# Patient Record
Sex: MALE | Race: WHITE | Employment: OTHER | URBAN - NONMETROPOLITAN AREA
[De-identification: names, ages, dates, MRNs, and addresses within clinical notes are randomized per-mention and may not be internally consistent; named-entity substitution may affect disease eponyms.]

---

## 2017-11-16 ENCOUNTER — HOSPITAL ENCOUNTER (OUTPATIENT)
Dept: NEUROLOGY | Age: 52
Discharge: HOME | End: 2017-11-16
Payer: MEDICARE

## 2017-11-16 PROCEDURE — 95911 NRV CNDJ TEST 9-10 STUDIES: CPT

## 2017-11-16 PROCEDURE — 95886 MUSC TEST DONE W/N TEST COMP: CPT | Performed by: PSYCHIATRY & NEUROLOGY

## 2017-11-16 PROCEDURE — 95911 NRV CNDJ TEST 9-10 STUDIES: CPT | Performed by: PSYCHIATRY & NEUROLOGY

## 2017-11-16 PROCEDURE — 95886 MUSC TEST DONE W/N TEST COMP: CPT

## 2017-11-22 ENCOUNTER — TRANSCRIBE ORDERS (OUTPATIENT)
Dept: ADMINISTRATIVE | Facility: HOSPITAL | Age: 52
End: 2017-11-22

## 2017-11-22 DIAGNOSIS — M54.2 NECK PAIN: Primary | ICD-10-CM

## 2017-11-22 DIAGNOSIS — M54.12 CERVICAL RADICULOPATHY: ICD-10-CM

## 2017-12-05 ENCOUNTER — HOSPITAL ENCOUNTER (OUTPATIENT)
Dept: MRI IMAGING | Facility: HOSPITAL | Age: 52
Discharge: HOME | End: 2017-12-05
Admitting: NURSE PRACTITIONER

## 2017-12-05 DIAGNOSIS — M54.12 CERVICAL RADICULOPATHY: ICD-10-CM

## 2017-12-05 DIAGNOSIS — M54.2 NECK PAIN: ICD-10-CM

## 2017-12-05 PROCEDURE — 72141 MRI NECK SPINE W/O DYE: CPT

## 2018-03-29 ENCOUNTER — TELEPHONE (OUTPATIENT)
Dept: NEUROLOGY | Age: 53
End: 2018-03-29

## 2018-04-20 ENCOUNTER — OFFICE VISIT (OUTPATIENT)
Dept: NEUROLOGY | Age: 53
End: 2018-04-20
Payer: MEDICARE

## 2018-04-20 VITALS
HEART RATE: 64 BPM | WEIGHT: 235.2 LBS | DIASTOLIC BLOOD PRESSURE: 76 MMHG | HEIGHT: 72 IN | BODY MASS INDEX: 31.86 KG/M2 | SYSTOLIC BLOOD PRESSURE: 132 MMHG

## 2018-04-20 DIAGNOSIS — R90.89 ABNORMAL FINDING ON MRI OF BRAIN: ICD-10-CM

## 2018-04-20 DIAGNOSIS — M47.812 SPONDYLOSIS OF CERVICAL REGION WITHOUT MYELOPATHY OR RADICULOPATHY: ICD-10-CM

## 2018-04-20 DIAGNOSIS — R20.2 BILATERAL ARM NUMBNESS AND TINGLING WHILE SLEEPING: ICD-10-CM

## 2018-04-20 DIAGNOSIS — M54.5 CHRONIC LOW BACK PAIN, UNSPECIFIED BACK PAIN LATERALITY, WITH SCIATICA PRESENCE UNSPECIFIED: ICD-10-CM

## 2018-04-20 DIAGNOSIS — R20.0 BILATERAL ARM NUMBNESS AND TINGLING WHILE SLEEPING: ICD-10-CM

## 2018-04-20 DIAGNOSIS — R41.3 MEMORY LOSS: Primary | ICD-10-CM

## 2018-04-20 DIAGNOSIS — G89.29 CHRONIC LOW BACK PAIN, UNSPECIFIED BACK PAIN LATERALITY, WITH SCIATICA PRESENCE UNSPECIFIED: ICD-10-CM

## 2018-04-20 PROCEDURE — 4004F PT TOBACCO SCREEN RCVD TLK: CPT | Performed by: PSYCHIATRY & NEUROLOGY

## 2018-04-20 PROCEDURE — G8427 DOCREV CUR MEDS BY ELIG CLIN: HCPCS | Performed by: PSYCHIATRY & NEUROLOGY

## 2018-04-20 PROCEDURE — 3017F COLORECTAL CA SCREEN DOC REV: CPT | Performed by: PSYCHIATRY & NEUROLOGY

## 2018-04-20 PROCEDURE — 99204 OFFICE O/P NEW MOD 45 MIN: CPT | Performed by: PSYCHIATRY & NEUROLOGY

## 2018-04-20 PROCEDURE — G8417 CALC BMI ABV UP PARAM F/U: HCPCS | Performed by: PSYCHIATRY & NEUROLOGY

## 2018-04-20 RX ORDER — ESCITALOPRAM OXALATE 10 MG/1
1 TABLET ORAL DAILY
Refills: 2 | COMMUNITY
Start: 2018-02-12 | End: 2022-03-09 | Stop reason: ALTCHOICE

## 2018-04-20 RX ORDER — NAPROXEN 500 MG/1
1 TABLET ORAL 2 TIMES DAILY PRN
Refills: 2 | COMMUNITY
Start: 2018-02-12 | End: 2022-03-09 | Stop reason: ALTCHOICE

## 2018-10-12 ENCOUNTER — OFFICE VISIT (OUTPATIENT)
Dept: ORTHOPEDIC SURGERY | Facility: CLINIC | Age: 53
End: 2018-10-12

## 2018-10-12 VITALS — WEIGHT: 237 LBS | HEIGHT: 72 IN | BODY MASS INDEX: 32.1 KG/M2

## 2018-10-12 DIAGNOSIS — M54.2 NECK PAIN: Primary | ICD-10-CM

## 2018-10-12 PROCEDURE — 99203 OFFICE O/P NEW LOW 30 MIN: CPT | Performed by: ORTHOPAEDIC SURGERY

## 2018-10-12 RX ORDER — TIZANIDINE 4 MG/1
4 TABLET ORAL NIGHTLY PRN
COMMUNITY

## 2018-10-12 RX ORDER — NAPROXEN 250 MG/1
250 TABLET ORAL 2 TIMES DAILY PRN
COMMUNITY

## 2018-10-12 RX ORDER — DICLOFENAC SODIUM 75 MG/1
75 TABLET, DELAYED RELEASE ORAL 2 TIMES DAILY
Qty: 60 TABLET | Refills: 2 | Status: SHIPPED | OUTPATIENT
Start: 2018-10-12

## 2018-10-12 NOTE — PROGRESS NOTES
Quan Arroyo is a 52 y.o. male   Primary provider:  Cuca Mueller APRN       Chief Complaint   Patient presents with   • Cervical Spine - Pain   • Lumbar Spine - Pain       HISTORY OF PRESENT ILLNESS: new patient with neck pain, and numbness in arms, patient was seen at [facility] in [facility] , patient had xrays done today.    52 y pain of the posterior neck region.  The pain is in the back of the head/ neck.  The pain is intermittent.  The pain is sharp and dull.   The pain is present in the neck region.  Tingling of the arm / hands  No fevers, no chills, pain is present and interrupts sleep.          Pain   This is a recurrent problem. The current episode started more than 1 year ago. The problem occurs constantly. The problem has been unchanged. Associated symptoms include joint swelling and numbness. Associated symptoms comments: tingling. The symptoms are aggravated by standing, twisting, walking and bending. He has tried nothing for the symptoms. The treatment provided no relief.        Smoker    CONCURRENT MEDICAL HISTORY:    No past medical history on file.    No Known Allergies      Current Outpatient Prescriptions:   •  naproxen (NAPROSYN) 250 MG tablet, Take 250 mg by mouth 2 (Two) Times a Day As Needed., Disp: , Rfl:   •  tiZANidine (ZANAFLEX) 4 MG tablet, Take 4 mg by mouth At Night As Needed for Muscle Spasms., Disp: , Rfl:   •  diclofenac (VOLTAREN) 75 MG EC tablet, Take 1 tablet by mouth 2 (Two) Times a Day., Disp: 60 tablet, Rfl: 2    No past surgical history on file.    No family history on file.     Social History     Social History   • Marital status:      Spouse name: N/A   • Number of children: N/A   • Years of education: N/A     Occupational History   • Not on file.     Social History Main Topics   • Smoking status: Not on file   • Smokeless tobacco: Not on file   • Alcohol use Not on file   • Drug use: Unknown   • Sexual activity: Not on file     Other Topics Concern   • Not  "on file     Social History Narrative   • No narrative on file        Review of Systems   Constitutional: Negative.    HENT: Negative.    Eyes: Negative.    Respiratory: Negative.    Cardiovascular: Negative.    Gastrointestinal: Negative.    Endocrine: Negative.    Genitourinary: Negative.    Musculoskeletal: Positive for joint swelling.   Skin: Negative.    Allergic/Immunologic: Negative.    Neurological: Positive for numbness.   Hematological: Negative.    Psychiatric/Behavioral: Negative.        PHYSICAL EXAMINATION:       Ht 182.9 cm (72\")   Wt 108 kg (237 lb)   BMI 32.14 kg/m²     Physical Exam   Constitutional: He is oriented to person, place, and time. He appears well-developed.   disheveled   HENT:   Head: Normocephalic.   Mouth/Throat: No oropharyngeal exudate.   Eyes: No scleral icterus.   Neck: No JVD present. No tracheal deviation present. No thyromegaly present.   Cardiovascular: Normal rate and intact distal pulses.    Pulmonary/Chest: Effort normal. No respiratory distress. He has no wheezes. He has no rales.   Abdominal: Soft. He exhibits no distension. There is no tenderness. There is no guarding.   Musculoskeletal: He exhibits no tenderness or deformity.   Neurological: He is alert and oriented to person, place, and time. He displays normal reflexes. No cranial nerve deficit. Coordination normal.   Skin: Skin is warm and dry. Capillary refill takes less than 2 seconds. No erythema. No pallor.   Psychiatric: He has a normal mood and affect. His behavior is normal. Thought content normal.       GAIT:     [x]  Normal  []  Antalgic    Assistive device: []  None  []  Walker     []  Crutches  []  Cane     []  Wheelchair  []  Stretcher    Ortho Exam     Cervical flexion is 30  Extension 10  Lateral rotation 20 right and left   Neuro/ motor intact    MRI of cervical spine- there is cervical disc bulge of C3-4    No results found.        ASSESSMENT:    Diagnoses and all orders for this visit:    Neck " pain    Other orders  -     naproxen (NAPROSYN) 250 MG tablet; Take 250 mg by mouth 2 (Two) Times a Day As Needed.  -     tiZANidine (ZANAFLEX) 4 MG tablet; Take 4 mg by mouth At Night As Needed for Muscle Spasms.  -     Discontinue: diclofenac (VOLTAREN) 50 MG EC tablet; Take 1 tablet by mouth 2 (Two) Times a Day.  -     diclofenac (VOLTAREN) 75 MG EC tablet; Take 1 tablet by mouth 2 (Two) Times a Day.          PLAN     I reviewed the MRI, cervical disc bulge at C3-4, which does not seem to warrant cervical discectomy.      Portillo Silverio MD

## 2021-03-30 ENCOUNTER — TELEPHONE (OUTPATIENT)
Dept: NEUROLOGY | Age: 56
End: 2021-03-30

## 2021-03-30 NOTE — TELEPHONE ENCOUNTER
Called patient about an appointment with Dr Jordin Mckeon, patient is aware of the appointment , location and phone number.

## 2022-03-09 ENCOUNTER — HOSPITAL ENCOUNTER (OUTPATIENT)
Dept: GENERAL RADIOLOGY | Age: 57
Discharge: HOME | End: 2022-03-09
Payer: MEDICARE

## 2022-03-09 ENCOUNTER — OFFICE VISIT (OUTPATIENT)
Dept: NEUROSURGERY | Age: 57
End: 2022-03-09
Payer: MEDICARE

## 2022-03-09 VITALS
OXYGEN SATURATION: 98 % | HEIGHT: 72 IN | DIASTOLIC BLOOD PRESSURE: 84 MMHG | HEART RATE: 85 BPM | WEIGHT: 235 LBS | BODY MASS INDEX: 31.83 KG/M2 | SYSTOLIC BLOOD PRESSURE: 138 MMHG

## 2022-03-09 DIAGNOSIS — M25.559 HIP PAIN: ICD-10-CM

## 2022-03-09 DIAGNOSIS — M54.2 NECK PAIN: ICD-10-CM

## 2022-03-09 DIAGNOSIS — M54.50 CHRONIC BILATERAL LOW BACK PAIN WITHOUT SCIATICA: ICD-10-CM

## 2022-03-09 DIAGNOSIS — R20.0 NUMBNESS: ICD-10-CM

## 2022-03-09 DIAGNOSIS — G89.29 CHRONIC BILATERAL LOW BACK PAIN WITHOUT SCIATICA: ICD-10-CM

## 2022-03-09 DIAGNOSIS — R53.1 WEAKNESS: ICD-10-CM

## 2022-03-09 DIAGNOSIS — R53.1 WEAKNESS: Primary | ICD-10-CM

## 2022-03-09 LAB
ALBUMIN SERPL-MCNC: 4.6 G/DL (ref 3.5–5.2)
ALP BLD-CCNC: 68 U/L (ref 40–130)
ALT SERPL-CCNC: 32 U/L (ref 5–41)
ANION GAP SERPL CALCULATED.3IONS-SCNC: 18 MMOL/L (ref 7–19)
AST SERPL-CCNC: 22 U/L (ref 5–40)
BASOPHILS ABSOLUTE: 0.1 K/UL (ref 0–0.2)
BASOPHILS RELATIVE PERCENT: 1 % (ref 0–1)
BILIRUB SERPL-MCNC: 0.3 MG/DL (ref 0.2–1.2)
BUN BLDV-MCNC: 19 MG/DL (ref 6–20)
C-REACTIVE PROTEIN: <0.3 MG/DL (ref 0–0.5)
CALCIUM SERPL-MCNC: 9.8 MG/DL (ref 8.6–10)
CHLORIDE BLD-SCNC: 101 MMOL/L (ref 98–111)
CO2: 22 MMOL/L (ref 22–29)
CREAT SERPL-MCNC: 1.2 MG/DL (ref 0.5–1.2)
EOSINOPHILS ABSOLUTE: 0.2 K/UL (ref 0–0.6)
EOSINOPHILS RELATIVE PERCENT: 2.9 % (ref 0–5)
GFR AFRICAN AMERICAN: >59
GFR NON-AFRICAN AMERICAN: >60
GLUCOSE BLD-MCNC: 92 MG/DL (ref 74–109)
HCT VFR BLD CALC: 53.6 % (ref 42–52)
HEMOGLOBIN: 16.7 G/DL (ref 14–18)
HIV-1 P24 AG: NORMAL
IMMATURE GRANULOCYTES #: 0 K/UL
LYMPHOCYTES ABSOLUTE: 1.2 K/UL (ref 1.1–4.5)
LYMPHOCYTES RELATIVE PERCENT: 17.9 % (ref 20–40)
MCH RBC QN AUTO: 26.7 PG (ref 27–31)
MCHC RBC AUTO-ENTMCNC: 31.2 G/DL (ref 33–37)
MCV RBC AUTO: 85.6 FL (ref 80–94)
MONOCYTES ABSOLUTE: 0.7 K/UL (ref 0–0.9)
MONOCYTES RELATIVE PERCENT: 10.2 % (ref 0–10)
NEUTROPHILS ABSOLUTE: 4.6 K/UL (ref 1.5–7.5)
NEUTROPHILS RELATIVE PERCENT: 67.6 % (ref 50–65)
PDW BLD-RTO: 13.2 % (ref 11.5–14.5)
PLATELET # BLD: 208 K/UL (ref 130–400)
PMV BLD AUTO: 10.5 FL (ref 9.4–12.4)
POTASSIUM SERPL-SCNC: 4.3 MMOL/L (ref 3.5–5)
RAPID HIV 1&2: NORMAL
RBC # BLD: 6.26 M/UL (ref 4.7–6.1)
RHEUMATOID FACTOR: <10 IU/ML
SEDIMENTATION RATE, ERYTHROCYTE: 2 MM/HR (ref 0–15)
SODIUM BLD-SCNC: 141 MMOL/L (ref 136–145)
T4 FREE: 1.46 NG/DL (ref 0.93–1.7)
TOTAL CK: 100 U/L (ref 39–308)
TOTAL PROTEIN: 7.9 G/DL (ref 6.6–8.7)
TSH SERPL DL<=0.05 MIU/L-ACNC: 1.34 UIU/ML (ref 0.27–4.2)
VITAMIN B-12: 413 PG/ML (ref 211–946)
WBC # BLD: 6.8 K/UL (ref 4.8–10.8)

## 2022-03-09 PROCEDURE — 99204 OFFICE O/P NEW MOD 45 MIN: CPT | Performed by: PSYCHIATRY & NEUROLOGY

## 2022-03-09 PROCEDURE — 73521 X-RAY EXAM HIPS BI 2 VIEWS: CPT

## 2022-03-09 RX ORDER — IBUPROFEN 400 MG/1
400 TABLET ORAL EVERY 6 HOURS PRN
COMMUNITY
End: 2022-08-30 | Stop reason: ALTCHOICE

## 2022-03-09 NOTE — PROGRESS NOTES
[facility] Neurology Office Note      Patient:   Dian Lizama  MR#:    [id number]  Account Number:                         YOB: 1965  Date of Evaluation:  3/9/2022  Time of Note:                          9:24 AM  Primary/Referring Physician:  JOCE Wheatley CNP   Consulting Physician:  Zenaida Cunningham DO    NEW PATIENT CONSULTATION    Chief Complaint   Patient presents with    New Patient     Referral for muscle weakness, fatigue,  vision and also forgeting somethings       HISTORY OF PRESENT ILLNESS    Dian Lizama is a 55y.o. year old male here for weakness, fatigue, visual difficulty, and memory loss. Symptoms have been present for years now. Patient notes intermittent muscle weakness, fatigue, hand swelling, hip pain, pain distally in the legs, memory loss, and visual changes. Some neck and back pain. Shoulder pain. Prior MRI negative per reports. Notes blurred vision, has seen optometry and evaluation there was negative per family. Some depression and anxiety noted. Prior tick bite history noted as well. Past Medical History:   Diagnosis Date    Allergic rhinitis     Asthma     Depression     Hyperlipidemia        Past Surgical History:   Procedure Laterality Date    APPENDECTOMY         History reviewed. No pertinent family history.     Social History     Socioeconomic History    Marital status:      Spouse name: Not on file    Number of children: Not on file    Years of education: Not on file    Highest education level: Not on file   Occupational History    Not on file   Tobacco Use    Smoking status: Never Smoker    Smokeless tobacco: Never Used   Vaping Use    Vaping Use: Never used   Substance and Sexual Activity    Alcohol use: No    Drug use: No    Sexual activity: Yes     Partners: Female   Other Topics Concern    Not on file   Social History Narrative    Not on file     Social Determinants of Health     Financial Resource Strain:     Difficulty of Paying Living Expenses: Not on file   Food Insecurity:     Worried About Running Out of Food in the Last Year: Not on file    Ran Out of Food in the Last Year: Not on file   Transportation Needs:     Lack of Transportation (Medical): Not on file    Lack of Transportation (Non-Medical): Not on file   Physical Activity:     Days of Exercise per Week: Not on file    Minutes of Exercise per Session: Not on file   Stress:     Feeling of Stress : Not on file   Social Connections:     Frequency of Communication with Friends and Family: Not on file    Frequency of Social Gatherings with Friends and Family: Not on file    Attends Sikh Services: Not on file    Active Member of [address] [place] [address] or Organizations: Not on file    Attends Club or Organization Meetings: Not on file    Marital Status: Not on file   Intimate Partner Violence:     Fear of Current or Ex-Partner: Not on file    Emotionally Abused: Not on file    Physically Abused: Not on file    Sexually Abused: Not on file   Housing Stability:     Unable to Pay for Housing in the Last Year: Not on file    Number of [place] in the Last Year: Not on file    Unstable Housing in the Last Year: Not on file       Current Outpatient Medications   Medication Sig Dispense Refill    ibuprofen (ADVIL;MOTRIN) 400 MG tablet Take 400 mg by mouth every 6 hours as needed for Pain       No current facility-administered medications for this visit.      ALLERGIES   No Known Allergies      REVIEW OF SYSTEMS    Constitutional: []Fever []Sweat []Chills [] Recent Injury [x] Denies all unless marked  HEENT:[x]Headache  [] Head Injury/Hearing Loss  [] Sore Throat  [] Ear Ache/Dizziness  [x] Denies all unless marked  Spine:  [x] Neck pain  [x] Back pain  [] Sciaticia  [x] Denies all unless marked  Cardiovascular:[]Heart Disease []Chest Pain [] Palpitations  [x] Denies all unless marked  Pulmonary: []Shortness of Breath []Cough   [x] Denies all unless jose ramon  Gastrointestinal: []Nausea  []Vomiting  []Abdominal Pain  []Constipation  []Diarrhea  []Dark Bloody Stools  [x] Denies all unless marked  Psychiatric/Behavioral:[x] Depression [x] Anxiety [x] Denies all unless marked  Genitourinary:   [] Frequency  [] Urgency  [] Incontinence [] Pain with Urination  [x] Denies all unless marked  Extremities: [x]Pain  [x]Swelling  [x] Denies all unless marked  Musculoskeletal: [x] Muscle Pain  [x] Joint Pain  [] Arthritis [x] Muscle Cramps [x] Muscle Twitches  [x] Denies all unless marked  Sleep: [x] Insomnia [] Snoring [] Restless Legs [] Sleep Apnea  [] Daytime Sleepiness  [x] Denies all unless marked  Skin:[] Rash [] Skin Discoloration [x] Denies all unless marked   Neurological: []Visual Disturbance/Memory Loss [x] Loss of Balance [] Slurred Speech/Weakness [] Seizures  [x] Vertigo/Dizziness [x] Denies all unless marked    I have reviewed the above ROS with the patient and agree with the ROS as documented above. PHYSICAL EXAM    Constitutional -   /84   Pulse 85   Ht 6' (1.829 m)   Wt 235 lb (106.6 kg)   SpO2 98%   BMI 31.87 kg/m²   General appearance: No acute distress   EYES -   Conjunctiva normal  Pupillary exam as below, see CN exam in the neurologic exam  ENT-    No scars, masses, or lesions over external nose or ears  Hearing normal bilaterally to finger rub  Cardiovascular -   No clubbing, cyanosis, or edema   Pulmonary-   Good expansion, normal effort without use of accessory muscles  Musculoskeletal -   No significant wasting of muscles noted  Gait as below, see gait exam in the neurologic exam  Muscle strength, tone, stability as below. No bony deformities  Skin -   Warm, dry, and intact to inspection and palpation.     No rash, erythema, or pallor  Psychiatric -   Mood, affect, and behavior appear normal    Memory as below see mental status examination in the neurologic exam    NEUROLOGICAL EXAM    Mental status   [x]Awake, alert, oriented [x]Affect attention and concentration appear appropriate  []Recent and remote memory appears unremarkable  [x]Speech normal without dysarthria or aphasia, comprehension and repetition intact. COMMENTS: Mild cognitive loss    Cranial Nerves [x]No VF deficit to confrontation,  no papilledema on fundoscopic exam.  [x]PERRLA, EOMI, no nystagmus, conjugate eye movements, no ptosis  [x]Face symmetric  [x]Facial sensation intact  [x]Tongue midline no atrophy or fasciculations present  [x]Palate midline, hearing to finger rub normal bilaterally  [x]Shoulder shrug and SCM testing normal bilaterally  COMMENTS: Masked facies    Motor   [x]5/5 strength x 4 extremities  [x]Normal bulk and tone  [x]No tremor present  []No rigidity or bradykinesia noted  COMMENTS:  Mild rigidity, bradykinesia noted    Sensory  []Sensation intact to light touch, pin prick, vibration, and proprioception BLE  []Sensation intact to light touch, pin prick, vibration, and proprioception BUE  COMMENTS: Mild decreased LT, PP, Vib BLE    Coordination [x]FTN normal bilaterally   []HTS normal bilaterally  []JESÚS normal bilaterally. COMMENTS:   Reflexes  [x]Symmetric and non-pathological  [x]Toes down going bilaterally  [x]No clonus present  COMMENTS:   Gait                  []Normal steady gait    []Ataxic    []Spastic     []Magnetic     []Shuffling  COMMENTS: Antalgic, slow turning, decreased arm swing        LABS RECORD AND IMAGING REVIEW (As below and per HPI)      ASSESSMENT:    Mary Pretty is a 55y.o. year old male here for fatigue, weakness, visual changes, back and neck pain, and memory loss. Unclear, differential is broad. Recent MRI brain reportedly negative. Has seen Rheumatology in the past, no overt diagnosis made there. There is possible subtle parkinson's findings on his exam.     PLAN:  1. Request recent MRI brain results. 2.  NCS/EMG  3. Additional labs  4. MRI C/L spine  5.   Treatment pending above, consider parkinson's treatment if exam progresses and becomes more consistent with underlying PD.     6.  Follow up with Rheumatology as directed     Alicia Gilbert DO  Board Certified Neurology

## 2022-03-10 LAB
ALDOLASE: 4.9 U/L (ref 1.2–7.6)
RPR: NORMAL

## 2022-03-11 LAB
ANA IGG, ELISA: NORMAL
ARSENIC BLOOD: <10 UG/L
LEAD LEVEL BLOOD: <2 UG/DL
MERCURY BLOOD: <2.5 UG/L

## 2022-03-12 LAB
ALBUMIN SERPL-MCNC: 4.24 G/DL (ref 3.75–5.01)
ALPHA-1-GLOBULIN: 0.37 G/DL (ref 0.19–0.46)
ALPHA-2-GLOBULIN: 0.85 G/DL (ref 0.48–1.05)
BETA GLOBULIN: 0.98 G/DL (ref 0.48–1.1)
GAMMA GLOBULIN: 1.16 G/DL (ref 0.62–1.51)
PROTEIN ELECTROPHORESIS, SERUM: NORMAL
SPE/IFE INTERPRETATION: NORMAL
TOTAL PROTEIN: 7.6 G/DL (ref 6.3–8.2)

## 2022-03-17 ENCOUNTER — HOSPITAL ENCOUNTER (OUTPATIENT)
Dept: NEUROLOGY | Age: 57
Discharge: HOME | End: 2022-03-17
Payer: MEDICARE

## 2022-03-17 ENCOUNTER — HOSPITAL ENCOUNTER (OUTPATIENT)
Dept: MRI IMAGING | Age: 57
Discharge: HOME | End: 2022-03-17
Payer: MEDICARE

## 2022-03-17 DIAGNOSIS — R53.1 WEAKNESS: ICD-10-CM

## 2022-03-17 DIAGNOSIS — R20.0 NUMBNESS: ICD-10-CM

## 2022-03-17 DIAGNOSIS — G89.29 CHRONIC BILATERAL LOW BACK PAIN WITHOUT SCIATICA: ICD-10-CM

## 2022-03-17 DIAGNOSIS — M25.559 HIP PAIN: ICD-10-CM

## 2022-03-17 DIAGNOSIS — M54.2 NECK PAIN: ICD-10-CM

## 2022-03-17 DIAGNOSIS — M54.50 CHRONIC BILATERAL LOW BACK PAIN WITHOUT SCIATICA: ICD-10-CM

## 2022-03-17 DIAGNOSIS — N28.1 RENAL CYST: Primary | ICD-10-CM

## 2022-03-17 PROCEDURE — 95913 NRV CNDJ TEST 13/> STUDIES: CPT | Performed by: PSYCHIATRY & NEUROLOGY

## 2022-03-17 PROCEDURE — 72148 MRI LUMBAR SPINE W/O DYE: CPT

## 2022-03-17 PROCEDURE — 95913 NRV CNDJ TEST 13/> STUDIES: CPT

## 2022-03-17 PROCEDURE — 95886 MUSC TEST DONE W/N TEST COMP: CPT

## 2022-03-17 PROCEDURE — 95886 MUSC TEST DONE W/N TEST COMP: CPT | Performed by: PSYCHIATRY & NEUROLOGY

## 2022-03-17 PROCEDURE — 72141 MRI NECK SPINE W/O DYE: CPT

## 2022-08-15 ENCOUNTER — HOSPITAL ENCOUNTER (OUTPATIENT)
Dept: ULTRASOUND IMAGING | Age: 57
Discharge: HOME | End: 2022-08-15
Payer: MEDICARE

## 2022-08-15 DIAGNOSIS — N28.1 RENAL CYST: ICD-10-CM

## 2022-08-15 PROCEDURE — 76770 US EXAM ABDO BACK WALL COMP: CPT

## 2022-08-15 PROCEDURE — 76770 US EXAM ABDO BACK WALL COMP: CPT | Performed by: RADIOLOGY

## 2022-08-30 ENCOUNTER — OFFICE VISIT (OUTPATIENT)
Dept: NEUROSURGERY | Age: 57
End: 2022-08-30
Payer: MEDICARE

## 2022-08-30 VITALS
OXYGEN SATURATION: 98 % | DIASTOLIC BLOOD PRESSURE: 74 MMHG | WEIGHT: 235 LBS | BODY MASS INDEX: 31.83 KG/M2 | HEART RATE: 78 BPM | SYSTOLIC BLOOD PRESSURE: 122 MMHG | HEIGHT: 72 IN

## 2022-08-30 DIAGNOSIS — R20.0 NUMBNESS: ICD-10-CM

## 2022-08-30 DIAGNOSIS — M25.559 HIP PAIN: ICD-10-CM

## 2022-08-30 DIAGNOSIS — N28.1 RENAL CYST: ICD-10-CM

## 2022-08-30 DIAGNOSIS — M54.50 CHRONIC BILATERAL LOW BACK PAIN WITHOUT SCIATICA: ICD-10-CM

## 2022-08-30 DIAGNOSIS — G89.29 CHRONIC BILATERAL LOW BACK PAIN WITHOUT SCIATICA: ICD-10-CM

## 2022-08-30 DIAGNOSIS — G21.9 SECONDARY PARKINSONISM, UNSPECIFIED SECONDARY PARKINSONISM TYPE (HCC): Primary | ICD-10-CM

## 2022-08-30 DIAGNOSIS — R53.1 WEAKNESS: ICD-10-CM

## 2022-08-30 DIAGNOSIS — M54.2 NECK PAIN: ICD-10-CM

## 2022-08-30 PROCEDURE — 99214 OFFICE O/P EST MOD 30 MIN: CPT | Performed by: PSYCHIATRY & NEUROLOGY

## 2022-08-30 RX ORDER — IBUPROFEN 800 MG/1
1 TABLET ORAL 2 TIMES DAILY
COMMUNITY
Start: 2022-06-06

## 2022-08-30 NOTE — PROGRESS NOTES
[facility] Neurology Office Note      Patient:   Darrion Agustin  MR#:    [id number]  Account Number:                         YOB: 1965  Date of Evaluation:  8/30/2022  Time of Note:                          9:06 AM  Primary/Referring Physician:  Sherryle Meier, APRN - CNP   Consulting Physician:  Dinesh Hutchinson, DO    FOLLOW UP VISIT    Chief Complaint   Patient presents with    Results     Results from recent tests    Follow-up     Weakness    Altered Mental Status     Very sanderson       HISTORY OF PRESENT ILLNESS    Darrion Agustin is a 55y.o. year old male here for weakness, fatigue, visual difficulty, and memory loss. Symptoms have been present for years now. Patient notes intermittent muscle weakness, fatigue, hand swelling, hip pain, pain distally in the legs, memory loss, and visual changes. Some neck and back pain. Shoulder pain. Prior MRI negative per reports. Notes blurred vision, has seen optometry and evaluation there was negative per family. Some depression and anxiety noted. Prior tick bite history noted as well. Doing about the same, no overt improvement noted. Past Medical History:   Diagnosis Date    Allergic rhinitis     Asthma     Depression     Hyperlipidemia        Past Surgical History:   Procedure Laterality Date    APPENDECTOMY         No family history on file.     Social History     Socioeconomic History    Marital status:      Spouse name: Not on file    Number of children: Not on file    Years of education: Not on file    Highest education level: Not on file   Occupational History    Not on file   Tobacco Use    Smoking status: Never    Smokeless tobacco: Never   Vaping Use    Vaping Use: Never used   Substance and Sexual Activity    Alcohol use: No    Drug use: No    Sexual activity: Yes     Partners: Female   Other Topics Concern    Not on file   Social History Narrative    Not on file     Social Determinants of Health     Financial Resource Strain: Not on file   Food Insecurity: Not on file   Transportation Needs: Not on file   Physical Activity: Not on file   Stress: Not on file   Social Connections: Not on file   Intimate Partner Violence: Not on file   Housing Stability: Not on file       Current Outpatient Medications   Medication Sig Dispense Refill    ibuprofen (ADVIL;MOTRIN) 800 MG tablet Take 1 tablet by mouth 2 times daily      NONFORMULARY Indications: CBD oil       No current facility-administered medications for this visit.      ALLERGIES   No Known Allergies      REVIEW OF SYSTEMS    Constitutional: []Fever []Sweat []Chills [] Recent Injury [x] Denies all unless marked  HEENT:[]Headache  [] Head Injury/Hearing Loss  [] Sore Throat  [] Ear Ache/Dizziness  [x] Denies all unless marked  Spine:  [x] Neck pain  [x] Back pain  [] Sciaticia  [x] Denies all unless marked  Cardiovascular:[]Heart Disease []Chest Pain [] Palpitations  [x] Denies all unless marked  Pulmonary: []Shortness of Breath []Cough   [x] Denies all unless marke  Gastrointestinal: []Nausea  []Vomiting  []Abdominal Pain  []Constipation  []Diarrhea  []Dark Bloody Stools  [x] Denies all unless marked  Psychiatric/Behavioral:[] Depression [] Anxiety [x] Denies all unless marked  Genitourinary:   [] Frequency  [] Urgency  [] Incontinence [] Pain with Urination  [x] Denies all unless marked  Extremities: [x]Pain  [x]Swelling  [x] Denies all unless marked  Musculoskeletal: [x] Muscle Pain  [x] Joint Pain  [] Arthritis [x] Muscle Cramps [x] Muscle Twitches  [x] Denies all unless marked  Sleep: [x] Insomnia [] Snoring [] Restless Legs [] Sleep Apnea  [] Daytime Sleepiness  [x] Denies all unless marked  Skin:[] Rash [] Skin Discoloration [x] Denies all unless marked   Neurological: []Visual Disturbance/Memory Loss [x] Loss of Balance [] Slurred Speech/Weakness [] Seizures  [x] Vertigo/Dizziness [x] Denies all unless marked     I have reviewed the above ROS with the patient and agree with the ROS as documented above. PHYSICAL EXAM    Constitutional -   /74   Pulse 78   Ht 6' (1.829 m)   Wt 235 lb (106.6 kg)   SpO2 98%   BMI 31.87 kg/m²   General appearance: No acute distress   EYES -   Conjunctiva normal  Pupillary exam as below, see CN exam in the neurologic exam  ENT-    No scars, masses, or lesions over external nose or ears  Hearing normal bilaterally to finger rub  Cardiovascular -   No clubbing, cyanosis, or edema   Pulmonary-   Good expansion, normal effort without use of accessory muscles  Musculoskeletal -   No significant wasting of muscles noted  Gait as below, see gait exam in the neurologic exam  Muscle strength, tone, stability as below. No bony deformities  Skin -   Warm, dry, and intact to inspection and palpation. No rash, erythema, or pallor  Psychiatric -   Mood, affect, and behavior appear normal    Memory as below see mental status examination in the neurologic exam    NEUROLOGICAL EXAM    Mental status   [x]Awake, alert, oriented   [x]Affect attention and concentration appear appropriate  []Recent and remote memory appears unremarkable  [x]Speech normal without dysarthria or aphasia, comprehension and repetition intact.    COMMENTS: Mild cognitive loss    Cranial Nerves [x]No VF deficit to confrontation,  no papilledema on fundoscopic exam.  [x]PERRLA, EOMI, no nystagmus, conjugate eye movements, no ptosis  [x]Face symmetric  [x]Facial sensation intact  [x]Tongue midline no atrophy or fasciculations present  [x]Palate midline, hearing to finger rub normal bilaterally  [x]Shoulder shrug and SCM testing normal bilaterally  COMMENTS: Masked facies    Motor   [x]5/5 strength x 4 extremities  [x]Normal bulk and tone  [x]No tremor present  []No rigidity or bradykinesia noted  COMMENTS:  Mild rigidity, bradykinesia noted    Sensory  []Sensation intact to light touch, pin prick, vibration, and proprioception BLE  []Sensation intact to light touch, pin prick, vibration, and proprioception BUE  COMMENTS: Mild decreased LT, PP, Vib BLE    Coordination [x]FTN normal bilaterally   []HTS normal bilaterally  []JESÚS normal bilaterally. COMMENTS:   Reflexes  [x]Symmetric and non-pathological  [x]Toes down going bilaterally  [x]No clonus present  COMMENTS:   Gait                  []Normal steady gait    []Ataxic    []Spastic     []Magnetic     []Shuffling  COMMENTS: Antalgic, slow turning, decreased arm swing        LABS RECORD AND IMAGING REVIEW (As below and per HPI)      ASSESSMENT:    Jamar Burnette is a 55y.o. year old male here for fatigue, weakness, visual changes, back and neck pain, and memory loss. Unclear, differential is broad. Recent MRI brain reportedly negative. MRI C/L spine with ddd, mild to moderate nf stenosis, no spinal stenosis. NCS/EMG with CTS otherwise negative. Labs negative. Has seen Rheumatology in the past, no overt diagnosis made there. There is possible subtle parkinson's findings on his exam. Question early atypical parkinsonism, underlying dementia. Largely unchanged since last seen. PLAN:  1. Re-request recent MRI brain results. 2.  Consider parkinson's treatment if exam progresses and becomes more consistent with underlying PD.    3.  Follow up with Rheumatology as directed   4. Follow ddd on spinal imaging, consider surgery referral if worsens. 5.  Will refer to [facility] for another opinion, movement specialist.   6.  Consider further workup with LP for CSF biomarkers for parkinsonism / dementia, paraneoplastic, autoimmune encephalitis panels, wilsons, thyroid auto-antibodies, or CAROLA abs. Will await opinion from [address] [place] [address].        Audrey Morales DO  Board Certified Neurology

## 2023-01-03 ENCOUNTER — OFFICE VISIT (OUTPATIENT)
Dept: NEUROLOGY | Age: 58
End: 2023-01-03
Payer: MEDICARE

## 2023-01-03 VITALS
HEART RATE: 67 BPM | HEIGHT: 72 IN | WEIGHT: 235 LBS | BODY MASS INDEX: 31.83 KG/M2 | DIASTOLIC BLOOD PRESSURE: 74 MMHG | OXYGEN SATURATION: 97 % | SYSTOLIC BLOOD PRESSURE: 128 MMHG

## 2023-01-03 DIAGNOSIS — R41.3 MEMORY LOSS: ICD-10-CM

## 2023-01-03 DIAGNOSIS — G20 PARKINSON'S DISEASE (HCC): ICD-10-CM

## 2023-01-03 DIAGNOSIS — G20 PARKINSON'S DISEASE (HCC): Primary | ICD-10-CM

## 2023-01-03 LAB
Lab: NORMAL
THIS TEST SENT TO: NORMAL

## 2023-01-03 PROCEDURE — 99214 OFFICE O/P EST MOD 30 MIN: CPT | Performed by: PSYCHIATRY & NEUROLOGY

## 2023-01-03 RX ORDER — ACETAMINOPHEN 325 MG/1
650 TABLET ORAL EVERY 6 HOURS PRN
COMMUNITY

## 2023-01-03 NOTE — PROGRESS NOTES
[facility] Neurology Office Note      Patient:   Darrion Agustin  MR#:    [id number]  Account Number:                         YOB: 1965  Date of Evaluation:  1/3/2023  Time of Note:                          10:23 AM  Primary/Referring Physician:  Sherryle Meier, APRN - CNP   Consulting Physician:  Dinesh Hutchinson, DO    FOLLOW UP VISIT    Chief Complaint   Patient presents with    Follow-up     Parkinson's wife wants to discuss memory and speak also mood mood swings     Tremors       HISTORY OF PRESENT ILLNESS    Darrion Agustin is a 65y.o. year old male here for weakness, fatigue, visual difficulty, and memory loss. Symptoms have been present for years now. Patient notes intermittent muscle weakness, fatigue, hand swelling, hip pain, pain distally in the legs, memory loss, and visual changes. Some neck and back pain. Shoulder pain. Prior MRI negative per reports. Notes blurred vision, has seen optometry and evaluation there was negative per family. Some depression and anxiety noted. Prior tick bite history noted as well. No overt improvement noted, some possible progression with speech and memory. More possible rigidity noted. Past Medical History:   Diagnosis Date    Allergic rhinitis     Asthma     Depression     Hyperlipidemia        Past Surgical History:   Procedure Laterality Date    APPENDECTOMY         No family history on file.     Social History     Socioeconomic History    Marital status:      Spouse name: Not on file    Number of children: Not on file    Years of education: Not on file    Highest education level: Not on file   Occupational History    Not on file   Tobacco Use    Smoking status: Never    Smokeless tobacco: Never   Vaping Use    Vaping Use: Never used   Substance and Sexual Activity    Alcohol use: No    Drug use: No    Sexual activity: Yes     Partners: Female   Other Topics Concern    Not on file   Social History Narrative    Not on file     Social Determinants of Health     Financial Resource Strain: Not on file   Food Insecurity: Not on file   Transportation Needs: Not on file   Physical Activity: Not on file   Stress: Not on file   Social Connections: Not on file   Intimate Partner Violence: Not on file   Housing Stability: Not on file       Current Outpatient Medications   Medication Sig Dispense Refill    acetaminophen (TYLENOL) 325 MG tablet Take 650 mg by mouth every 6 hours as needed for Pain      ibuprofen (ADVIL;MOTRIN) 800 MG tablet Take 1 tablet by mouth 2 times daily       No current facility-administered medications for this visit.      ALLERGIES   No Known Allergies      REVIEW OF SYSTEMS    Constitutional: []Fever []Sweat []Chills [] Recent Injury [x] Denies all unless marked  HEENT:[]Headache  [] Head Injury/Hearing Loss  [] Sore Throat  [] Ear Ache/Dizziness  [x] Denies all unless marked  Spine:  [] Neck pain  [] Back pain  [] Sciaticia  [x] Denies all unless marked  Cardiovascular:[]Heart Disease []Chest Pain [] Palpitations  [x] Denies all unless marked  Pulmonary: []Shortness of Breath []Cough   [x] Denies all unless marke  Gastrointestinal: []Nausea  []Vomiting  []Abdominal Pain  []Constipation  []Diarrhea  []Dark Bloody Stools  [x] Denies all unless marked  Psychiatric/Behavioral:[] Depression [] Anxiety [x] Denies all unless marked  Genitourinary:   [] Frequency  [] Urgency  [] Incontinence [] Pain with Urination  [x] Denies all unless marked  Extremities: []Pain  []Swelling  [x] Denies all unless marked  Musculoskeletal: [] Muscle Pain  [] Joint Pain  [] Arthritis [] Muscle Cramps [] Muscle Twitches  [x] Denies all unless marked  Sleep: [] Insomnia [] Snoring [] Restless Legs [] Sleep Apnea  [] Daytime Sleepiness  [x] Denies all unless marked  Skin:[] Rash [] Skin Discoloration [x] Denies all unless marked   Neurological: []Visual Disturbance/Memory Loss [] Loss of Balance [] Slurred Speech/Weakness [] Seizures  [] Vertigo/Dizziness [x] Denies all unless marked          I have reviewed the above ROS with the patient and agree with the ROS as documented above. PHYSICAL EXAM    Constitutional -   /74   Pulse 67   Ht 6' (1.829 m)   Wt 235 lb (106.6 kg)   SpO2 97%   BMI 31.87 kg/m²   General appearance: No acute distress   EYES -   Conjunctiva normal  Pupillary exam as below, see CN exam in the neurologic exam  ENT-    No scars, masses, or lesions over external nose or ears  Hearing normal bilaterally to finger rub  Cardiovascular -   No clubbing, cyanosis, or edema   Pulmonary-   Good expansion, normal effort without use of accessory muscles  Musculoskeletal -   No significant wasting of muscles noted  Gait as below, see gait exam in the neurologic exam  Muscle strength, tone, stability as below. No bony deformities  Skin -   Warm, dry, and intact to inspection and palpation. No rash, erythema, or pallor  Psychiatric -   Mood, affect, and behavior appear normal    Memory as below see mental status examination in the neurologic exam    NEUROLOGICAL EXAM    Mental status   [x]Awake, alert, oriented   [x]Affect attention and concentration appear appropriate  []Recent and remote memory appears unremarkable  [x]Speech normal without dysarthria or aphasia, comprehension and repetition intact.    COMMENTS: Mild cognitive loss    Cranial Nerves [x]No VF deficit to confrontation,  no papilledema on fundoscopic exam.  [x]PERRLA, EOMI, no nystagmus, conjugate eye movements, no ptosis  [x]Face symmetric  [x]Facial sensation intact  [x]Tongue midline no atrophy or fasciculations present  [x]Palate midline, hearing to finger rub normal bilaterally  [x]Shoulder shrug and SCM testing normal bilaterally  COMMENTS: Masked facies    Motor   [x]5/5 strength x 4 extremities  [x]Normal bulk and tone  [x]No tremor present  []No rigidity or bradykinesia noted  COMMENTS:  Mild rigidity, bradykinesia noted    Sensory  []Sensation intact to light touch, pin prick, vibration, and proprioception BLE  []Sensation intact to light touch, pin prick, vibration, and proprioception BUE  COMMENTS: Mild decreased LT, PP, Vib BLE    Coordination [x]FTN normal bilaterally   []HTS normal bilaterally  []JESÚS normal bilaterally. COMMENTS:   Reflexes  [x]Symmetric and non-pathological  [x]Toes down going bilaterally  [x]No clonus present  COMMENTS:   Gait                  []Normal steady gait    []Ataxic    []Spastic     []Magnetic     []Shuffling  COMMENTS: Antalgic, slow turning, decreased arm swing        LABS RECORD AND IMAGING REVIEW (As below and per HPI)      ASSESSMENT:    Lucho Dexter is a 65y.o. year old male here for fatigue, weakness, visual changes, back and neck pain, and memory loss. Unclear, differential is broad. Prior MRI brain reportedly negative. MRI C/L spine with ddd, mild to moderate nf stenosis, no spinal stenosis. NCS/EMG with CTS otherwise negative. Labs negative. Has seen Rheumatology in the past, no overt diagnosis made there. There is possible subtle parkinson's findings on his exam. Question early atypical parkinsonism, underlying dementia. Some progression noted since last seen. PLAN:  1. Repeat MRI brain   2. Consider parkinson's treatment if exam progresses and becomes more consistent with underlying PD.    3.  Follow up with Rheumatology as directed   4. Follow ddd on spinal imaging, consider surgery referral if worsens. 5.  Awaiting referral to [facility] neurology for another opinion, movement specialist.   6.  Consider further workup with LP for CSF biomarkers for parkinsonism / dementia. Will go ahead and send paraneoplastic, autoimmune encephalitis panels, wilsons, thyroid auto-antibodies, and CAROLA abs. Will await opinion from [address] [place] [address] for further workup.        Sheri Cho, DO  Board Certified Neurology Spontaneous, unlabored and symmetrical

## 2023-01-05 LAB
COPPER: 110.7 UG/DL (ref 70–140)
THYROID PEROXIDASE (TPO) ABS: 1.6 IU/ML (ref 0–9)

## 2023-01-06 LAB
CERULOPLASMIN: 26 MG/DL (ref 15–30)
GLUTAMIC ACID DECARB AB: <5 IU/ML (ref 0–5)
THYROID STIMULATING IMMUNOGLOBULIN: <0.1 IU/L

## 2023-01-18 ENCOUNTER — HOSPITAL ENCOUNTER (OUTPATIENT)
Dept: MRI IMAGING | Age: 58
Discharge: HOME | End: 2023-01-18
Payer: MEDICARE

## 2023-01-18 DIAGNOSIS — G20 PARKINSON'S DISEASE (HCC): ICD-10-CM

## 2023-01-18 PROCEDURE — 70553 MRI BRAIN STEM W/O & W/DYE: CPT | Performed by: RADIOLOGY

## 2023-01-18 PROCEDURE — 70553 MRI BRAIN STEM W/O & W/DYE: CPT

## 2023-01-18 PROCEDURE — 6360000004 HC RX CONTRAST MEDICATION: Performed by: PSYCHIATRY & NEUROLOGY

## 2023-01-18 PROCEDURE — A9577 INJ MULTIHANCE: HCPCS | Performed by: PSYCHIATRY & NEUROLOGY

## 2023-01-18 RX ADMIN — GADOBENATE DIMEGLUMINE 20 ML: 529 INJECTION, SOLUTION INTRAVENOUS at 09:43

## 2023-02-15 ENCOUNTER — OFFICE VISIT (OUTPATIENT)
Dept: NEUROLOGY | Age: 58
End: 2023-02-15
Payer: MEDICARE

## 2023-02-15 VITALS
HEIGHT: 72 IN | BODY MASS INDEX: 31.83 KG/M2 | SYSTOLIC BLOOD PRESSURE: 126 MMHG | HEART RATE: 78 BPM | WEIGHT: 235 LBS | OXYGEN SATURATION: 96 % | DIASTOLIC BLOOD PRESSURE: 74 MMHG

## 2023-02-15 DIAGNOSIS — G20 PARKINSON'S DISEASE (HCC): ICD-10-CM

## 2023-02-15 DIAGNOSIS — R41.3 MEMORY LOSS: Primary | ICD-10-CM

## 2023-02-15 PROCEDURE — 99214 OFFICE O/P EST MOD 30 MIN: CPT | Performed by: PSYCHIATRY & NEUROLOGY

## 2023-02-15 NOTE — PROGRESS NOTES
[facility] Neurology Office Note      Patient:   Marcin Dennis  MR#:    [id number]  Account Number:                         YOB: 1965  Date of Evaluation:  2/15/2023  Time of Note:                          9:28 AM  Primary/Referring Physician:  JOCE Fall CNP   Consulting Physician:  Ramos Chilel DO    FOLLOW UP VISIT    Chief Complaint   Patient presents with    Follow-up     6 week follow up     Results     Results from recent MRI/ labs     Pain     Extremity pain also stumbling more having some dizziness       HISTORY OF PRESENT ILLNESS    Marcin Dennis is a 65y.o. year old male here for weakness, fatigue, visual difficulty, and memory loss. Symptoms have been present for years now. Patient notes intermittent muscle weakness, fatigue, hand swelling, hip pain, pain distally in the legs, memory loss, and visual changes. Some neck and back pain. Shoulder pain. Prior MRI negative per reports. Notes blurred vision, has seen optometry and evaluation there was negative per family. Some depression and anxiety noted. Prior tick bite history noted as well. No overt improvement noted, still doing about the same since last seen. Plans to be seen at [facility] in April. More possible rigidity noted. Past Medical History:   Diagnosis Date    Allergic rhinitis     Asthma     Depression     Hyperlipidemia        Past Surgical History:   Procedure Laterality Date    APPENDECTOMY         No family history on file.     Social History     Socioeconomic History    Marital status:      Spouse name: Not on file    Number of children: Not on file    Years of education: Not on file    Highest education level: Not on file   Occupational History    Not on file   Tobacco Use    Smoking status: Never    Smokeless tobacco: Never   Vaping Use    Vaping Use: Never used   Substance and Sexual Activity    Alcohol use: No    Drug use: No    Sexual activity: Yes     Partners: Female   Other Topics Concern    Not on file   Social History Narrative    Not on file     Social Determinants of Health     Financial Resource Strain: Not on file   Food Insecurity: Not on file   Transportation Needs: Not on file   Physical Activity: Not on file   Stress: Not on file   Social Connections: Not on file   Intimate Partner Violence: Not on file   Housing Stability: Not on file       Current Outpatient Medications   Medication Sig Dispense Refill    ibuprofen (ADVIL;MOTRIN) 800 MG tablet Take 1 tablet by mouth 2 times daily       No current facility-administered medications for this visit.      ALLERGIES   No Known Allergies      REVIEW OF SYSTEMS    Constitutional: []Fever []Sweat []Chills [] Recent Injury [x] Denies all unless marked  HEENT:[]Headache  [] Head Injury/Hearing Loss  [] Sore Throat  [] Ear Ache/Dizziness  [x] Denies all unless marked  Spine:  [] Neck pain  [] Back pain  [] Sciaticia  [x] Denies all unless marked  Cardiovascular:[]Heart Disease []Chest Pain [] Palpitations  [x] Denies all unless marked  Pulmonary: []Shortness of Breath []Cough   [x] Denies all unless marke  Gastrointestinal: []Nausea  []Vomiting  [x]Abdominal Pain  []Constipation  []Diarrhea  []Dark Bloody Stools  [x] Denies all unless marked  Psychiatric/Behavioral:[] Depression [] Anxiety [x] Denies all unless marked  Genitourinary:   [] Frequency  [] Urgency  [] Incontinence [] Pain with Urination  [x] Denies all unless marked  Extremities: []Pain  []Swelling  [x] Denies all unless marked  Musculoskeletal: [x] Muscle Pain  [x] Joint Pain  [] Arthritis [] Muscle Cramps [x] Muscle Twitches  [x] Denies all unless marked  Sleep: [] Insomnia [] Snoring [] Restless Legs [] Sleep Apnea  [] Daytime Sleepiness  [x] Denies all unless marked  Skin:[] Rash [] Skin Discoloration [x] Denies all unless marked   Neurological: []Visual Disturbance/Memory Loss [x] Loss of Balance [] Slurred Speech/Weakness [] Seizures  [x] Vertigo/Dizziness [x] Denies all unless marked               I have reviewed the above ROS with the patient and agree with the ROS as documented above. PHYSICAL EXAM    Constitutional -   /74   Pulse 78   Ht 6' (1.829 m)   Wt 235 lb (106.6 kg)   SpO2 96%   BMI 31.87 kg/m²   General appearance: No acute distress   EYES -   Conjunctiva normal  Pupillary exam as below, see CN exam in the neurologic exam  ENT-    No scars, masses, or lesions over external nose or ears  Hearing normal bilaterally to finger rub  Cardiovascular -   No clubbing, cyanosis, or edema   Pulmonary-   Good expansion, normal effort without use of accessory muscles  Musculoskeletal -   No significant wasting of muscles noted  Gait as below, see gait exam in the neurologic exam  Muscle strength, tone, stability as below. No bony deformities  Skin -   Warm, dry, and intact to inspection and palpation. No rash, erythema, or pallor  Psychiatric -   Mood, affect, and behavior appear normal    Memory as below see mental status examination in the neurologic exam    NEUROLOGICAL EXAM    Mental status   [x]Awake, alert, oriented   [x]Affect attention and concentration appear appropriate  []Recent and remote memory appears unremarkable  [x]Speech normal without dysarthria or aphasia, comprehension and repetition intact.    COMMENTS: Mild cognitive loss    Cranial Nerves [x]No VF deficit to confrontation,  no papilledema on fundoscopic exam.  [x]PERRLA, EOMI, no nystagmus, conjugate eye movements, no ptosis  [x]Face symmetric  [x]Facial sensation intact  [x]Tongue midline no atrophy or fasciculations present  [x]Palate midline, hearing to finger rub normal bilaterally  [x]Shoulder shrug and SCM testing normal bilaterally  COMMENTS: Masked facies    Motor   [x]5/5 strength x 4 extremities  [x]Normal bulk and tone  [x]No tremor present  []No rigidity or bradykinesia noted  COMMENTS:  Mild rigidity, bradykinesia noted    Sensory  []Sensation intact to light touch, pin prick, vibration, and proprioception BLE  []Sensation intact to light touch, pin prick, vibration, and proprioception BUE  COMMENTS: Mild decreased LT, PP, Vib BLE    Coordination [x]FTN normal bilaterally   []HTS normal bilaterally  []JESÚS normal bilaterally. COMMENTS:   Reflexes  [x]Symmetric and non-pathological  [x]Toes down going bilaterally  [x]No clonus present  COMMENTS:   Gait                  []Normal steady gait    []Ataxic    []Spastic     []Magnetic     []Shuffling  COMMENTS: Antalgic, slow turning, decreased arm swing        LABS RECORD AND IMAGING REVIEW (As below and per HPI)    MRI brain normal, reviewed. ASSESSMENT:    Elizabeth Pereira is a 65y.o. year old male here for fatigue, weakness, visual changes, back and neck pain, and memory loss. Unclear, differential is broad. Prior MRI brain reportedly negative. Repeat brain MRI here was normal. MRI C/L spine with ddd, mild to moderate nf stenosis, no spinal stenosis. NCS/EMG with CTS otherwise negative. Labs negative. Has seen Rheumatology in the past, no overt diagnosis made there. There is possible subtle parkinson's findings on his exam. Question early atypical parkinsonism, underlying dementia. Doing about the same since last seen. PLAN:  1. Consider parkinson's treatment if exam progresses and becomes more consistent with underlying PD.    2.  Follow up with Rheumatology as directed   3. Follow ddd on spinal imaging, consider surgery referral if worsens. 4.  Awaiting referral to [facility] neurology for another opinion, movement specialist.   5.  Consider further workup with LP for CSF biomarkers for parkinsonism / dementia. Will follow up paraneoplastic, autoimmune encephalitis panels. Wilsons, thyroid auto-antibodies, and CAROLA abs were negative. Will await opinion from [address] [place] [address] for further workup.        Jeimy Barrera DO  Board Certified Neurology

## 2023-05-03 ENCOUNTER — OFFICE VISIT (OUTPATIENT)
Dept: NEUROLOGY | Age: 58
End: 2023-05-03
Payer: MEDICARE

## 2023-05-03 VITALS
BODY MASS INDEX: 31.83 KG/M2 | WEIGHT: 235 LBS | HEART RATE: 87 BPM | SYSTOLIC BLOOD PRESSURE: 124 MMHG | HEIGHT: 72 IN | OXYGEN SATURATION: 99 % | DIASTOLIC BLOOD PRESSURE: 76 MMHG

## 2023-05-03 DIAGNOSIS — G20 PARKINSON'S DISEASE (HCC): Primary | ICD-10-CM

## 2023-05-03 DIAGNOSIS — R41.3 MEMORY LOSS: ICD-10-CM

## 2023-05-03 PROCEDURE — 99214 OFFICE O/P EST MOD 30 MIN: CPT | Performed by: PSYCHIATRY & NEUROLOGY

## 2023-05-03 NOTE — PROGRESS NOTES
[facility] Neurology Office Note      Patient:   Darrel Flanagan  MR#:    [id number]  Account Number:                         YOB: 1965  Date of Evaluation:  5/3/2023  Time of Note:                          10:21 AM  Primary/Referring Physician:  JOCE Curry CNP   Consulting Physician:  Paulino Hardin DO    FOLLOW UP VISIT    Chief Complaint   Patient presents with    Follow-up     Follow up from [facility] can review note in care everywhere    Memory Loss    Tremors       HISTORY OF PRESENT ILLNESS    Darrel Flanagan is a 65y.o. year old male here for weakness, fatigue, visual difficulty, and memory loss. Seen at [facility], recommended neuorpsychiatric testing, did not feel he has parkinsonsim. Symptoms have been present for years now. Patient notes intermittent muscle weakness, fatigue, hand swelling, hip pain, pain distally in the legs, memory loss, and visual changes. Some neck and back pain. Shoulder pain. Prior MRI negative per reports. Notes blurred vision, has seen optometry and evaluation there was negative per family. Some depression and anxiety noted. Prior tick bite history noted as well. No overt improvement noted, still doing about the same since last seen. More possible rigidity noted. Past Medical History:   Diagnosis Date    Allergic rhinitis     Asthma     Depression     Hyperlipidemia        Past Surgical History:   Procedure Laterality Date    APPENDECTOMY         No family history on file.     Social History     Socioeconomic History    Marital status:      Spouse name: Not on file    Number of children: Not on file    Years of education: Not on file    Highest education level: Not on file   Occupational History    Not on file   Tobacco Use    Smoking status: Never    Smokeless tobacco: Never   Vaping Use    Vaping Use: Never used   Substance and Sexual Activity    Alcohol use: No    Drug use: No    Sexual activity: Yes     Partners: Female   Other